# Patient Record
Sex: FEMALE | Race: WHITE | Employment: PART TIME | URBAN - METROPOLITAN AREA
[De-identification: names, ages, dates, MRNs, and addresses within clinical notes are randomized per-mention and may not be internally consistent; named-entity substitution may affect disease eponyms.]

---

## 2017-04-10 NOTE — PROGRESS NOTES
Need to change MRI Brain w/o to MRI Brain w/wo attn Pituitary to be able to assess the pituitary for diagnosis.

## 2017-04-11 ENCOUNTER — HOSPITAL ENCOUNTER (OUTPATIENT)
Dept: MRI IMAGING | Age: 42
Discharge: HOME | End: 2017-04-11
Payer: COMMERCIAL

## 2017-04-11 VITALS — BODY MASS INDEX: 33.49 KG/M2 | WEIGHT: 182 LBS | HEIGHT: 62 IN

## 2017-04-11 DIAGNOSIS — E34.9 ABNORMAL LH LEVEL: ICD-10-CM

## 2017-04-11 DIAGNOSIS — R79.89 ABNORMAL PITUITARY LUTEINIZING HORMONE (LH): ICD-10-CM

## 2017-04-11 PROCEDURE — 70553 MRI BRAIN STEM W/O & W/DYE: CPT

## 2017-04-11 PROCEDURE — 74011250636 HC RX REV CODE- 250/636

## 2017-04-11 PROCEDURE — 74011000258 HC RX REV CODE- 258

## 2017-04-11 PROCEDURE — A9577 INJ MULTIHANCE: HCPCS

## 2017-04-11 RX ORDER — SODIUM CHLORIDE 0.9 % (FLUSH) 0.9 %
10 SYRINGE (ML) INJECTION
Status: COMPLETED | OUTPATIENT
Start: 2017-04-11 | End: 2017-04-11

## 2017-04-11 RX ADMIN — SODIUM CHLORIDE 100 ML: 900 INJECTION, SOLUTION INTRAVENOUS at 14:28

## 2017-04-11 RX ADMIN — Medication 10 ML: at 14:17

## 2017-04-11 RX ADMIN — GADOBENATE DIMEGLUMINE 17 ML: 529 INJECTION, SOLUTION INTRAVENOUS at 14:17

## 2017-04-24 ENCOUNTER — HOSPITAL ENCOUNTER (EMERGENCY)
Age: 42
Discharge: HOME | End: 2017-04-24
Attending: EMERGENCY MEDICINE
Payer: COMMERCIAL

## 2017-04-24 ENCOUNTER — HOSPITAL ENCOUNTER (OUTPATIENT)
Dept: ULTRASOUND IMAGING | Age: 42
Discharge: HOME | End: 2017-04-24
Attending: NURSE PRACTITIONER
Payer: COMMERCIAL

## 2017-04-24 VITALS
SYSTOLIC BLOOD PRESSURE: 141 MMHG | HEIGHT: 62 IN | RESPIRATION RATE: 18 BRPM | BODY MASS INDEX: 33.13 KG/M2 | DIASTOLIC BLOOD PRESSURE: 79 MMHG | HEART RATE: 74 BPM | TEMPERATURE: 98.1 F | OXYGEN SATURATION: 99 % | WEIGHT: 180 LBS

## 2017-04-24 DIAGNOSIS — M79.602 LEFT ARM PAIN: ICD-10-CM

## 2017-04-24 DIAGNOSIS — I80.8 SUPERFICIAL THROMBOPHLEBITIS OF LEFT UPPER EXTREMITY: Primary | ICD-10-CM

## 2017-04-24 DIAGNOSIS — R22.32 LOCALIZED SWELLING, MASS AND LUMP, LEFT UPPER LIMB: ICD-10-CM

## 2017-04-24 PROCEDURE — 93971 EXTREMITY STUDY: CPT

## 2017-04-24 PROCEDURE — 99283 EMERGENCY DEPT VISIT LOW MDM: CPT | Performed by: EMERGENCY MEDICINE

## 2017-04-24 NOTE — ED PROVIDER NOTES
HPI Comments: 59-year-old white female underwent a contrasted MRI approximately 2 weeks ago. For this procedure she had an IV placed in her left before meals. Since then she has had some swelling and tenderness. She underwent ultrasound today which showed a thrombophlebitis with occlusion of the basilic vein from the mid humerus to the proximal forearm and nonocclusive thrombus noted in one of the paired fascia and brachial veins at the junction with the basilic vein. She was sent to the emergency department for further management. She has no other complaints at this time. The history is provided by the patient. Past Medical History:   Diagnosis Date    Acute laryngitis, without mention of obstruction     Acute maxillary sinusitis     Acute upper respiratory infections of unspecified site     Anxiety     Arrhythmia     PVCs. Padma Hummer Borderline hyperlipidemia     Breast screening, unspecified     Contusion of face, scalp, and neck except eye(s)     Contusion of thigh     Cough     Depressive disorder, not elsewhere classified     Dietary surveillance and counseling     Esophageal reflux     Exercise counseling     Injury, other and unspecified, unspecified site     Leg pain     Nocturia     Other malaise and fatigue     Palpitations     Pituitary tumor 2017    Polyuria     Psychiatric disorder     depression    Routine general medical examination at a health care facility     Routine gynecological examination     Screening for other and unspecified cardiovascular conditions     Unspecified adverse effect of anesthesia     difficult to arouse after general    Unspecified fall        Past Surgical History:   Procedure Laterality Date    CARDIAC SURG PROCEDURE UNLIST  2013    ablation    HX  SECTION   &     HX CHOLECYSTECTOMY  3/2014    [facility] - AT [facility]    HX OTHER SURGICAL  2017    oral gum surgery    HX TONSILLECTOMY      HX TUBAL LIGATION 2004         Family History:   Problem Relation Age of Onset    Diabetes Mother      NON-INSULIN DEPENDENT    Cancer Mother      SKIN    No Known Problems Father     No Known Problems Sister     No Known Problems Brother     No Known Problems Brother     Coronary Artery Disease Maternal Grandmother     Diabetes Maternal Grandmother      NON-INSULIN DEPENDENT    Cancer Maternal Grandmother      blood cancer    Coronary Artery Disease Maternal Grandfather     No Known Problems Paternal Grandmother     Cancer Paternal Grandfather      LUNG    Diabetes Maternal Aunt      NON-INSULIN DEPENDENT    Hypertension Other      FAMILY MEMBERS IN GENERAL    Other Sister 28     mysthenia gravus, thymus tumor and masses in lungs concern for ca    Other Maternal Uncle      stage 4 CA - lymphoma       Social History     Social History    Marital status:      Spouse name: N/A    Number of children: N/A    Years of education: N/A     Occupational History    Not on file. Social History Main Topics    Smoking status: Former Smoker     Packs/day: 1.00     Years: 10.00     Types: Cigarettes     Quit date: 1/1/2001    Smokeless tobacco: Never Used    Alcohol use 0.0 oz/week     0 Standard drinks or equivalent per week      Comment: RARELY    Drug use: No    Sexual activity: Not Currently     Other Topics Concern    Not on file     Social History Narrative         ALLERGIES: Adhesive and Other medication    Review of Systems   Constitutional: Negative for fever. Respiratory: Negative for cough and shortness of breath. Gastrointestinal: Negative for vomiting. Neurological: Negative for headaches. Vitals:    04/24/17 1644   BP: 136/85   Pulse: 70   Resp: 16   Temp: 98.4 °F (36.9 °C)   SpO2: 100%   Weight: 81.6 kg (180 lb)   Height: 5' 2\" (1.575 m)            Physical Exam   Constitutional: She appears well-developed and well-nourished. No distress. HENT:   Head: Normocephalic and atraumatic. Cardiovascular: Normal rate. Pulmonary/Chest: Effort normal.   Musculoskeletal:   Mild swelling to the proximal ulnar aspect of the left forearm. There is no erythema or induration. She has good range of motion. Good distal pulses. Neurological: She is alert. Skin: Skin is warm and dry. Psychiatric: She has a normal mood and affect. Nursing note and vitals reviewed. MDM  Number of Diagnoses or Management Options  Diagnosis management comments: The patient has an explanation for her clot in that she recently had an IV with contrast.   The clot is mostly in the superficial vein and is nonocclusive in the deeper vein. At this time I do not believe that this clot warrants the risk of full anticoagulation. I did offer arixtra however at this time she would prefer to treat with aspirin and will obtain repeat ultrasound in 2 weeks to see if the clot is resolving or enlarging.     ED Course       Procedures

## 2017-04-24 NOTE — DISCHARGE INSTRUCTIONS
Superficial Thrombophlebitis: Care Instructions  Your Care Instructions  Superficial thrombophlebitis is inflammation in a vein where a blood clot has formed close to the surface of the skin. You may be able to feel the clot as a firm lump under the skin. The skin over the clot can become red, tender, and warm to the touch. Blood clots in veins close to the skin's surface usually are not serious and often can be treated at home. Sometimes superficial thrombophlebitis spreads to a deeper vein (deep vein thrombosis, or DVT). These deeper clots can be serious, even life-threatening. It is very important that you follow your doctor's instructions, keep all follow-up appointments, and watch for new or worsening symptoms of a clot. Follow-up care is a key part of your treatment and safety. Be sure to make and go to all appointments, and call your doctor if you are having problems. It's also a good idea to know your test results and keep a list of the medicines you take. How can you care for yourself at home? · Take your medicines exactly as prescribed. Call your doctor if you think you are having a problem with your medicine. You will get more details on the specific medicines your doctor prescribes. · Prop up the sore leg or arm on a pillow anytime you sit or lie down. Try to keep it above the level of your heart. To prevent thrombophlebitis  · Exercise. Keep blood moving in your legs to keep new clots from forming. · Get up out of bed as soon as possible after an illness or surgery. · Do not smoke. If you need help quitting, talk to your doctor about stop-smoking programs and medicines. These can increase your chances of quitting for good. · Ask your doctor about compression stockings. These may help prevent blood clots from forming in your legs. You can buy these with a prescription at medical supply stores and some drugstores. When should you call for help?   Call 911 anytime you think you may need emergency care. For example, call if:  · You have sudden chest pain and shortness of breath, or you cough up blood. · You vomit blood or what looks like coffee grounds. · You pass maroon or very bloody stools. · You passed out (lost consciousness). Call your doctor now or seek immediate medical care if:  · You have signs of a blood clot, such as:  ¨ Pain in your calf, back of the knee, thigh, or groin. ¨ Redness and swelling in your leg or groin. · You notice a new hard, red, or tender area in your leg. · Your stools are black and tarlike or have streaks of blood. Watch closely for changes in your health, and be sure to contact your doctor if:  · You do not get better as expected. Where can you learn more? Go to http://mike-dereck.info/. Enter [phone] in the search box to learn more about \"Superficial Thrombophlebitis: Care Instructions. \"  Current as of: June 4, 2016  Content Version: 11.2  © 2400-8771 [employer]. Care instructions adapted under license by [employer] (which disclaims liability or warranty for this information). If you have questions about a medical condition or this instruction, always ask your healthcare professional. [address] any warranty or liability for your use of this information.

## 2017-04-25 ENCOUNTER — HOSPITAL ENCOUNTER (OUTPATIENT)
Dept: GENERAL RADIOLOGY | Age: 42
Discharge: HOME | End: 2017-04-25
Payer: COMMERCIAL

## 2017-04-25 DIAGNOSIS — Z87.891 FORMER SMOKER: ICD-10-CM

## 2017-04-25 DIAGNOSIS — I82.622 ACUTE DEEP VEIN THROMBOSIS (DVT) OF OTHER VEIN OF LEFT UPPER EXTREMITY (HCC): ICD-10-CM

## 2017-04-25 PROCEDURE — 71020 XR CHEST PA LAT: CPT

## 2017-04-25 NOTE — PROGRESS NOTES
Left Upper Extremity Ultrasound positive - Patient aware of results and followed up with ED after ultrasound. She also has appointment scheduled with Wilson Hernandez today. If patient does not show or cancels appointment, please let me know so appropriate follow up can be arranged.

## 2017-05-02 PROBLEM — I82.622 ACUTE DEEP VEIN THROMBOSIS (DVT) OF LEFT UPPER EXTREMITY (HCC): Status: ACTIVE | Noted: 2017-05-02

## 2017-05-03 PROBLEM — R60.0 LOCALIZED EDEMA: Status: ACTIVE | Noted: 2017-05-03

## 2017-05-03 PROBLEM — Z86.718 HISTORY OF DVT (DEEP VEIN THROMBOSIS): Status: ACTIVE | Noted: 2017-05-03

## 2017-05-03 PROBLEM — I80.8 SUPERFICIAL THROMBOPHLEBITIS OF LEFT UPPER EXTREMITY: Status: ACTIVE | Noted: 2017-05-03

## 2017-05-11 ENCOUNTER — HOSPITAL ENCOUNTER (OUTPATIENT)
Dept: LAB | Age: 42
Discharge: HOME | End: 2017-05-11
Payer: COMMERCIAL

## 2017-05-11 DIAGNOSIS — D68.59 THROMBOPHILIA (HCC): ICD-10-CM

## 2017-05-11 LAB
ALBUMIN SERPL BCP-MCNC: 4.1 G/DL (ref 3.5–5)
ALBUMIN/GLOB SERPL: 1.2 {RATIO} (ref 1.2–3.5)
ALP SERPL-CCNC: 70 U/L (ref 50–136)
ALT SERPL-CCNC: 24 U/L (ref 12–65)
ANION GAP BLD CALC-SCNC: 7 MMOL/L (ref 7–16)
AST SERPL W P-5'-P-CCNC: 13 U/L (ref 15–37)
BASOPHILS # BLD AUTO: 0 K/UL (ref 0–0.2)
BASOPHILS # BLD: 1 % (ref 0–2)
BILIRUB SERPL-MCNC: 0.6 MG/DL (ref 0.2–1.1)
BUN SERPL-MCNC: 6 MG/DL (ref 6–23)
CALCIUM SERPL-MCNC: 9 MG/DL (ref 8.3–10.4)
CHLORIDE SERPL-SCNC: 104 MMOL/L (ref 98–107)
CO2 SERPL-SCNC: 28 MMOL/L (ref 21–32)
CREAT SERPL-MCNC: 0.93 MG/DL (ref 0.6–1)
D DIMER PPP FEU-MCNC: 0.79 UG/ML(FEU)
DIFFERENTIAL METHOD BLD: ABNORMAL
EOSINOPHIL # BLD: 0.2 K/UL (ref 0–0.8)
EOSINOPHIL NFR BLD: 4 % (ref 0.5–7.8)
ERYTHROCYTE [DISTWIDTH] IN BLOOD BY AUTOMATED COUNT: 12.5 % (ref 11.9–14.6)
GLOBULIN SER CALC-MCNC: 3.4 G/DL (ref 2.3–3.5)
GLUCOSE SERPL-MCNC: 74 MG/DL (ref 65–100)
HCT VFR BLD AUTO: 40.4 % (ref 35.8–46.3)
HGB BLD-MCNC: 14 G/DL (ref 11.7–15.4)
LYMPHOCYTES # BLD AUTO: 45 % (ref 13–44)
LYMPHOCYTES # BLD: 2.2 K/UL (ref 0.5–4.6)
MCH RBC QN AUTO: 32 PG (ref 26.1–32.9)
MCHC RBC AUTO-ENTMCNC: 34.7 G/DL (ref 31.4–35)
MCV RBC AUTO: 92.4 FL (ref 79.6–97.8)
MONOCYTES # BLD: 0.3 K/UL (ref 0.1–1.3)
MONOCYTES NFR BLD AUTO: 7 % (ref 4–12)
NEUTS SEG # BLD: 2.2 K/UL (ref 1.7–8.2)
NEUTS SEG NFR BLD AUTO: 44 % (ref 43–78)
NRBC # BLD: 0 K/UL (ref 0–0.2)
PLATELET # BLD AUTO: 178 K/UL (ref 150–450)
PMV BLD AUTO: 12.2 FL (ref 10.8–14.1)
POTASSIUM SERPL-SCNC: 4 MMOL/L (ref 3.5–5.1)
PROT SERPL-MCNC: 7.5 G/DL (ref 6.3–8.2)
RBC # BLD AUTO: 4.37 M/UL (ref 4.05–5.25)
SODIUM SERPL-SCNC: 139 MMOL/L (ref 136–145)
WBC # BLD AUTO: 5 K/UL (ref 4.3–11.1)

## 2017-05-11 PROCEDURE — 81240 F2 GENE: CPT | Performed by: INTERNAL MEDICINE

## 2017-05-11 PROCEDURE — 85303 CLOT INHIBIT PROT C ACTIVITY: CPT | Performed by: INTERNAL MEDICINE

## 2017-05-11 PROCEDURE — 86147 CARDIOLIPIN ANTIBODY EA IG: CPT | Performed by: INTERNAL MEDICINE

## 2017-05-11 PROCEDURE — 85379 FIBRIN DEGRADATION QUANT: CPT | Performed by: INTERNAL MEDICINE

## 2017-05-11 PROCEDURE — 81241 F5 GENE: CPT | Performed by: INTERNAL MEDICINE

## 2017-05-11 PROCEDURE — 85300 ANTITHROMBIN III ACTIVITY: CPT | Performed by: INTERNAL MEDICINE

## 2017-05-11 PROCEDURE — 86146 BETA-2 GLYCOPROTEIN ANTIBODY: CPT | Performed by: INTERNAL MEDICINE

## 2017-05-11 PROCEDURE — 85610 PROTHROMBIN TIME: CPT | Performed by: INTERNAL MEDICINE

## 2017-05-11 PROCEDURE — 80053 COMPREHEN METABOLIC PANEL: CPT | Performed by: INTERNAL MEDICINE

## 2017-05-11 PROCEDURE — 85306 CLOT INHIBIT PROT S FREE: CPT | Performed by: INTERNAL MEDICINE

## 2017-05-11 PROCEDURE — 36415 COLL VENOUS BLD VENIPUNCTURE: CPT | Performed by: INTERNAL MEDICINE

## 2017-05-11 PROCEDURE — 85301 ANTITHROMBIN III ANTIGEN: CPT | Performed by: INTERNAL MEDICINE

## 2017-05-11 PROCEDURE — 85025 COMPLETE CBC W/AUTO DIFF WBC: CPT | Performed by: INTERNAL MEDICINE

## 2017-05-13 LAB
B2 GLYCOPROT1 IGA SER-ACNC: <9 GPI IGA UNITS (ref 0–25)
B2 GLYCOPROT1 IGG SER-ACNC: <9 GPI IGG UNITS (ref 0–20)
B2 GLYCOPROT1 IGM SER-ACNC: <9 GPI IGM UNITS (ref 0–32)
CARDIOLIPIN IGA SER IA-ACNC: <9 APL U/ML (ref 0–11)
CARDIOLIPIN IGG SER IA-ACNC: <9 GPL U/ML (ref 0–14)
CARDIOLIPIN IGM SER IA-ACNC: 15 MPL U/ML (ref 0–12)

## 2017-05-18 LAB
AT III ACT/NOR PPP CHRO: NORMAL %
AT III AG ACT/NOR PPP IA: NORMAL %
FACTOR V LEIDEN MUTATION, XMF5LT: NORMAL %
LUPUS ANTICOAG PANEL, XMLUPT: NORMAL
PROT C ACT/NOR PPP CHRO: NORMAL %
PROT S ACT/NOR PPP CHRO: NORMAL %
PROTHROMBIN G2021A MUTATION, XMPTMT: NORMAL

## 2017-06-16 PROBLEM — N92.6 IRREGULAR MENSES: Status: ACTIVE | Noted: 2017-06-16

## 2017-06-16 PROBLEM — R89.9 ABNORMAL LABORATORY TEST: Status: ACTIVE | Noted: 2017-06-16

## 2017-06-21 ENCOUNTER — HOSPITAL ENCOUNTER (OUTPATIENT)
Dept: LAB | Age: 42
Discharge: HOME | End: 2017-06-21
Payer: COMMERCIAL

## 2017-06-21 DIAGNOSIS — I82.622 ACUTE DEEP VEIN THROMBOSIS (DVT) OF LEFT UPPER EXTREMITY, UNSPECIFIED VEIN (HCC): ICD-10-CM

## 2017-06-21 LAB
ALBUMIN SERPL BCP-MCNC: 3.8 G/DL (ref 3.5–5)
ALBUMIN/GLOB SERPL: 1.2 {RATIO} (ref 1.2–3.5)
ALP SERPL-CCNC: 72 U/L (ref 50–136)
ALT SERPL-CCNC: 32 U/L (ref 12–65)
ANION GAP BLD CALC-SCNC: 8 MMOL/L (ref 7–16)
AST SERPL W P-5'-P-CCNC: 25 U/L (ref 15–37)
BASOPHILS # BLD AUTO: 0 K/UL (ref 0–0.2)
BASOPHILS # BLD: 1 % (ref 0–2)
BILIRUB SERPL-MCNC: 0.7 MG/DL (ref 0.2–1.1)
BUN SERPL-MCNC: 8 MG/DL (ref 6–23)
CALCIUM SERPL-MCNC: 8.1 MG/DL (ref 8.3–10.4)
CHLORIDE SERPL-SCNC: 103 MMOL/L (ref 98–107)
CO2 SERPL-SCNC: 25 MMOL/L (ref 21–32)
CREAT SERPL-MCNC: 0.77 MG/DL (ref 0.6–1)
D DIMER PPP FEU-MCNC: <0.19 UG/ML(FEU)
DIFFERENTIAL METHOD BLD: ABNORMAL
EOSINOPHIL # BLD: 0.2 K/UL (ref 0–0.8)
EOSINOPHIL NFR BLD: 5 % (ref 0.5–7.8)
ERYTHROCYTE [DISTWIDTH] IN BLOOD BY AUTOMATED COUNT: 12 % (ref 11.9–14.6)
GLOBULIN SER CALC-MCNC: 3.1 G/DL (ref 2.3–3.5)
GLUCOSE SERPL-MCNC: 99 MG/DL (ref 65–100)
HCT VFR BLD AUTO: 38.1 % (ref 35.8–46.3)
HGB BLD-MCNC: 13.5 G/DL (ref 11.7–15.4)
LYMPHOCYTES # BLD AUTO: 29 % (ref 13–44)
LYMPHOCYTES # BLD: 1.2 K/UL (ref 0.5–4.6)
MCH RBC QN AUTO: 32.2 PG (ref 26.1–32.9)
MCHC RBC AUTO-ENTMCNC: 35.4 G/DL (ref 31.4–35)
MCV RBC AUTO: 90.9 FL (ref 79.6–97.8)
MONOCYTES # BLD: 0.4 K/UL (ref 0.1–1.3)
MONOCYTES NFR BLD AUTO: 9 % (ref 4–12)
NEUTS SEG # BLD: 2.3 K/UL (ref 1.7–8.2)
NEUTS SEG NFR BLD AUTO: 56 % (ref 43–78)
NRBC # BLD: 0 K/UL (ref 0–0.2)
PLATELET # BLD AUTO: 128 K/UL (ref 150–450)
PMV BLD AUTO: 11.8 FL (ref 10.8–14.1)
POTASSIUM SERPL-SCNC: 4 MMOL/L (ref 3.5–5.1)
PROT SERPL-MCNC: 6.9 G/DL (ref 6.3–8.2)
RBC # BLD AUTO: 4.19 M/UL (ref 4.05–5.25)
SODIUM SERPL-SCNC: 136 MMOL/L (ref 136–145)
WBC # BLD AUTO: 4.1 K/UL (ref 4.3–11.1)

## 2017-06-21 PROCEDURE — 85379 FIBRIN DEGRADATION QUANT: CPT | Performed by: INTERNAL MEDICINE

## 2017-06-21 PROCEDURE — 85025 COMPLETE CBC W/AUTO DIFF WBC: CPT | Performed by: INTERNAL MEDICINE

## 2017-06-21 PROCEDURE — 80053 COMPREHEN METABOLIC PANEL: CPT | Performed by: INTERNAL MEDICINE

## 2017-06-21 PROCEDURE — 36415 COLL VENOUS BLD VENIPUNCTURE: CPT | Performed by: INTERNAL MEDICINE

## 2017-09-21 ENCOUNTER — HOSPITAL ENCOUNTER (OUTPATIENT)
Dept: LAB | Age: 42
Discharge: HOME | End: 2017-09-21
Payer: COMMERCIAL

## 2017-09-21 DIAGNOSIS — R76.0 ANTICARDIOLIPIN ANTIBODY POSITIVE: ICD-10-CM

## 2017-09-21 DIAGNOSIS — I82.622 ACUTE DEEP VEIN THROMBOSIS (DVT) OF LEFT UPPER EXTREMITY, UNSPECIFIED VEIN (HCC): ICD-10-CM

## 2017-09-21 LAB
ALBUMIN SERPL-MCNC: 4 G/DL (ref 3.5–5)
ALBUMIN/GLOB SERPL: 1.3 {RATIO} (ref 1.2–3.5)
ALP SERPL-CCNC: 63 U/L (ref 50–136)
ALT SERPL-CCNC: 22 U/L (ref 12–65)
ANION GAP SERPL CALC-SCNC: 6 MMOL/L (ref 7–16)
AST SERPL-CCNC: 14 U/L (ref 15–37)
BASOPHILS # BLD: 0 K/UL (ref 0–0.2)
BASOPHILS NFR BLD: 1 % (ref 0–2)
BILIRUB SERPL-MCNC: 0.8 MG/DL (ref 0.2–1.1)
BUN SERPL-MCNC: 10 MG/DL (ref 6–23)
CALCIUM SERPL-MCNC: 8.8 MG/DL (ref 8.3–10.4)
CHLORIDE SERPL-SCNC: 105 MMOL/L (ref 98–107)
CO2 SERPL-SCNC: 27 MMOL/L (ref 21–32)
CREAT SERPL-MCNC: 0.96 MG/DL (ref 0.6–1)
D DIMER PPP FEU-MCNC: 0.3 UG/ML(FEU)
DIFFERENTIAL METHOD BLD: ABNORMAL
EOSINOPHIL # BLD: 0.3 K/UL (ref 0–0.8)
EOSINOPHIL NFR BLD: 6 % (ref 0.5–7.8)
ERYTHROCYTE [DISTWIDTH] IN BLOOD BY AUTOMATED COUNT: 11.8 % (ref 11.9–14.6)
GLOBULIN SER CALC-MCNC: 3.2 G/DL (ref 2.3–3.5)
GLUCOSE SERPL-MCNC: 82 MG/DL (ref 65–100)
HCT VFR BLD AUTO: 37.9 % (ref 35.8–46.3)
HGB BLD-MCNC: 13.4 G/DL (ref 11.7–15.4)
LYMPHOCYTES # BLD: 1.5 K/UL (ref 0.5–4.6)
LYMPHOCYTES NFR BLD: 33 % (ref 13–44)
MCH RBC QN AUTO: 32.2 PG (ref 26.1–32.9)
MCHC RBC AUTO-ENTMCNC: 35.4 G/DL (ref 31.4–35)
MCV RBC AUTO: 91.1 FL (ref 79.6–97.8)
MONOCYTES # BLD: 0.5 K/UL (ref 0.1–1.3)
MONOCYTES NFR BLD: 10 % (ref 4–12)
NEUTS SEG # BLD: 2.3 K/UL (ref 1.7–8.2)
NEUTS SEG NFR BLD: 51 % (ref 43–78)
NRBC # BLD: 0 K/UL (ref 0–0.2)
PLATELET # BLD AUTO: 151 K/UL (ref 150–450)
PMV BLD AUTO: 11.7 FL (ref 10.8–14.1)
POTASSIUM SERPL-SCNC: 4.1 MMOL/L (ref 3.5–5.1)
PROT SERPL-MCNC: 7.2 G/DL (ref 6.3–8.2)
RBC # BLD AUTO: 4.16 M/UL (ref 4.05–5.25)
SODIUM SERPL-SCNC: 138 MMOL/L (ref 136–145)
WBC # BLD AUTO: 4.6 K/UL (ref 4.3–11.1)

## 2017-09-21 PROCEDURE — 85025 COMPLETE CBC W/AUTO DIFF WBC: CPT | Performed by: INTERNAL MEDICINE

## 2017-09-21 PROCEDURE — 85379 FIBRIN DEGRADATION QUANT: CPT | Performed by: INTERNAL MEDICINE

## 2017-09-21 PROCEDURE — 86147 CARDIOLIPIN ANTIBODY EA IG: CPT | Performed by: INTERNAL MEDICINE

## 2017-09-21 PROCEDURE — 36415 COLL VENOUS BLD VENIPUNCTURE: CPT | Performed by: INTERNAL MEDICINE

## 2017-09-21 PROCEDURE — 80053 COMPREHEN METABOLIC PANEL: CPT | Performed by: INTERNAL MEDICINE

## 2017-09-23 LAB
CARDIOLIPIN IGA SER IA-ACNC: <9 APL U/ML (ref 0–11)
CARDIOLIPIN IGG SER IA-ACNC: <9 GPL U/ML (ref 0–14)
CARDIOLIPIN IGM SER IA-ACNC: 16 MPL U/ML (ref 0–12)

## 2017-12-11 ENCOUNTER — HOSPITAL ENCOUNTER (OUTPATIENT)
Dept: MRI IMAGING | Age: 42
Discharge: HOME | End: 2017-12-11
Attending: INTERNAL MEDICINE
Payer: COMMERCIAL

## 2017-12-11 ENCOUNTER — HOSPITAL ENCOUNTER (OUTPATIENT)
Dept: LAB | Age: 42
Discharge: HOME | End: 2017-12-11
Payer: COMMERCIAL

## 2017-12-11 VITALS — BODY MASS INDEX: 32.94 KG/M2 | WEIGHT: 183 LBS

## 2017-12-11 DIAGNOSIS — R76.0 ANTICARDIOLIPIN ANTIBODY POSITIVE: ICD-10-CM

## 2017-12-11 DIAGNOSIS — D35.2 PITUITARY MICROADENOMA (HCC): ICD-10-CM

## 2017-12-11 DIAGNOSIS — D68.59 THROMBOPHILIA (HCC): ICD-10-CM

## 2017-12-11 LAB
ALBUMIN SERPL-MCNC: 4.1 G/DL (ref 3.5–5)
ALBUMIN/GLOB SERPL: 1.3 {RATIO}
ALP SERPL-CCNC: 69 U/L (ref 50–136)
ALT SERPL-CCNC: 14 U/L (ref 12–65)
ANION GAP SERPL CALC-SCNC: 7 MMOL/L
AST SERPL-CCNC: 12 U/L (ref 15–37)
BASOPHILS # BLD: 0 K/UL (ref 0–0.2)
BASOPHILS NFR BLD: 1 % (ref 0–2)
BILIRUB SERPL-MCNC: 0.5 MG/DL (ref 0.2–1.1)
BUN SERPL-MCNC: 5 MG/DL (ref 6–23)
CALCIUM SERPL-MCNC: 8.6 MG/DL (ref 8.3–10.4)
CHLORIDE SERPL-SCNC: 107 MMOL/L (ref 98–107)
CO2 SERPL-SCNC: 26 MMOL/L (ref 21–32)
CREAT SERPL-MCNC: 0.9 MG/DL (ref 0.6–1)
D DIMER PPP FEU-MCNC: 0.37 UG/ML(FEU)
DIFFERENTIAL METHOD BLD: ABNORMAL
EOSINOPHIL # BLD: 0.2 K/UL (ref 0–0.8)
EOSINOPHIL NFR BLD: 4 % (ref 0.5–7.8)
ERYTHROCYTE [DISTWIDTH] IN BLOOD BY AUTOMATED COUNT: 11.8 % (ref 11.9–14.6)
GLOBULIN SER CALC-MCNC: 3.2 G/DL
GLUCOSE SERPL-MCNC: 82 MG/DL (ref 65–100)
HCT VFR BLD AUTO: 38.5 % (ref 35.8–46.3)
HGB BLD-MCNC: 13.4 G/DL (ref 11.7–15.4)
LYMPHOCYTES # BLD: 1.4 K/UL (ref 0.5–4.6)
LYMPHOCYTES NFR BLD: 32 % (ref 13–44)
MCH RBC QN AUTO: 31.7 PG (ref 26.1–32.9)
MCHC RBC AUTO-ENTMCNC: 34.8 G/DL (ref 31.4–35)
MCV RBC AUTO: 91 FL (ref 79.6–97.8)
MONOCYTES # BLD: 0.3 K/UL (ref 0.1–1.3)
MONOCYTES NFR BLD: 7 % (ref 4–12)
NEUTS SEG # BLD: 2.4 K/UL (ref 1.7–8.2)
NEUTS SEG NFR BLD: 56 % (ref 43–78)
PLATELET # BLD AUTO: 158 K/UL (ref 150–450)
PMV BLD AUTO: 12.7 FL (ref 10.8–14.1)
POTASSIUM SERPL-SCNC: 4 MMOL/L (ref 3.5–5.1)
PROT SERPL-MCNC: 7.3 G/DL (ref 6.3–8.2)
RBC # BLD AUTO: 4.23 M/UL (ref 4.05–5.25)
SODIUM SERPL-SCNC: 140 MMOL/L (ref 136–145)
WBC # BLD AUTO: 4.3 K/UL (ref 4.3–11.1)

## 2017-12-11 PROCEDURE — 70553 MRI BRAIN STEM W/O & W/DYE: CPT

## 2017-12-11 PROCEDURE — 74011250636 HC RX REV CODE- 250/636: Performed by: INTERNAL MEDICINE

## 2017-12-11 PROCEDURE — A9577 INJ MULTIHANCE: HCPCS | Performed by: INTERNAL MEDICINE

## 2017-12-11 PROCEDURE — 74011000258 HC RX REV CODE- 258: Performed by: INTERNAL MEDICINE

## 2017-12-11 RX ORDER — SODIUM CHLORIDE 0.9 % (FLUSH) 0.9 %
10 SYRINGE (ML) INJECTION
Status: COMPLETED | OUTPATIENT
Start: 2017-12-11 | End: 2017-12-11

## 2017-12-11 RX ADMIN — GADOBENATE DIMEGLUMINE 17 ML: 529 INJECTION, SOLUTION INTRAVENOUS at 12:29

## 2017-12-11 RX ADMIN — SODIUM CHLORIDE 100 ML: 900 INJECTION, SOLUTION INTRAVENOUS at 12:29

## 2017-12-11 RX ADMIN — Medication 10 ML: at 12:29

## 2017-12-11 NOTE — PROGRESS NOTES
Please let Ms. Gilberto Johns know that her MRI looks the same as before. We will discuss at her appointment.

## 2017-12-12 PROBLEM — R41.3 MEMORY DIFFICULTY: Status: ACTIVE | Noted: 2017-12-12

## 2017-12-13 LAB
CARDIOLIPIN IGA SER IA-ACNC: <9 APL U/ML (ref 0–11)
CARDIOLIPIN IGG SER IA-ACNC: <9 GPL U/ML (ref 0–14)
CARDIOLIPIN IGM SER IA-ACNC: 20 MPL U/ML (ref 0–12)

## 2017-12-14 LAB — LUPUS ANTICOAG PANEL, XMLUPT: NORMAL

## 2017-12-22 PROBLEM — R90.89 ABNORMAL BRAIN MRI: Status: ACTIVE | Noted: 2017-12-22

## 2017-12-22 PROBLEM — F33.9 RECURRENT DEPRESSION (HCC): Status: ACTIVE | Noted: 2017-12-22

## 2018-06-19 ENCOUNTER — HOSPITAL ENCOUNTER (OUTPATIENT)
Dept: MRI IMAGING | Age: 43
Discharge: HOME | End: 2018-06-19
Attending: INTERNAL MEDICINE

## 2019-03-07 LAB
CHOLESTEROL, TOTAL: 162 MG/DL
CHOLESTEROL/HDL RATIO: NORMAL
HDLC SERPL-MCNC: 44 MG/DL (ref 35–70)
LDL CHOLESTEROL CALCULATED: 101 MG/DL (ref 0–160)
NONHDLC SERPL-MCNC: NORMAL MG/DL
TRIGL SERPL-MCNC: 85 MG/DL
VLDLC SERPL CALC-MCNC: 17 MG/DL

## 2019-03-14 PROBLEM — E66.01 SEVERE OBESITY (HCC): Status: ACTIVE | Noted: 2019-03-14

## 2019-04-02 ENCOUNTER — HOSPITAL ENCOUNTER (OUTPATIENT)
Dept: GENERAL RADIOLOGY | Age: 44
Discharge: HOME | End: 2019-04-02

## 2019-04-02 ENCOUNTER — HOSPITAL ENCOUNTER (OUTPATIENT)
Dept: MAMMOGRAPHY | Age: 44
Discharge: HOME | End: 2019-04-02

## 2019-04-02 DIAGNOSIS — Z12.39 SCREENING FOR BREAST CANCER: ICD-10-CM

## 2019-04-02 DIAGNOSIS — M25.511 ACUTE PAIN OF RIGHT SHOULDER: ICD-10-CM

## 2019-08-02 PROBLEM — I82.622 ACUTE DEEP VEIN THROMBOSIS (DVT) OF LEFT UPPER EXTREMITY (HCC): Status: RESOLVED | Noted: 2017-05-02 | Resolved: 2019-08-02

## 2019-08-02 PROBLEM — I80.8 SUPERFICIAL THROMBOPHLEBITIS OF LEFT UPPER EXTREMITY: Status: RESOLVED | Noted: 2017-05-03 | Resolved: 2019-08-02

## 2021-01-26 ENCOUNTER — HOSPITAL ENCOUNTER (OUTPATIENT)
Dept: MRI IMAGING | Age: 46
Discharge: HOME | End: 2021-01-26
Attending: PHYSICIAN ASSISTANT
Payer: COMMERCIAL

## 2021-01-26 DIAGNOSIS — F40.240 CLAUSTROPHOBIA: ICD-10-CM

## 2021-01-26 DIAGNOSIS — D35.2 PITUITARY MICROADENOMA (HCC): ICD-10-CM

## 2021-01-26 PROCEDURE — 74011000258 HC RX REV CODE- 258: Performed by: PHYSICIAN ASSISTANT

## 2021-01-26 PROCEDURE — 70553 MRI BRAIN STEM W/O & W/DYE: CPT

## 2021-01-26 PROCEDURE — A9575 INJ GADOTERATE MEGLUMI 0.1ML: HCPCS | Performed by: PHYSICIAN ASSISTANT

## 2021-01-26 PROCEDURE — 74011250636 HC RX REV CODE- 250/636: Performed by: PHYSICIAN ASSISTANT

## 2021-01-26 RX ORDER — SODIUM CHLORIDE 0.9 % (FLUSH) 0.9 %
10 SYRINGE (ML) INJECTION
Status: COMPLETED | OUTPATIENT
Start: 2021-01-26 | End: 2021-01-26

## 2021-01-26 RX ORDER — GADOTERATE MEGLUMINE 376.9 MG/ML
17 INJECTION INTRAVENOUS
Status: COMPLETED | OUTPATIENT
Start: 2021-01-26 | End: 2021-01-26

## 2021-01-26 RX ADMIN — SODIUM CHLORIDE 100 ML: 900 INJECTION, SOLUTION INTRAVENOUS at 19:19

## 2021-01-26 RX ADMIN — GADOTERATE MEGLUMINE 17 ML: 376.9 INJECTION INTRAVENOUS at 19:18

## 2021-01-26 RX ADMIN — Medication 10 ML: at 19:18

## 2022-01-24 NOTE — ED TRIAGE NOTES
[facility] sent Rx request for the following:      Requested Prescriptions   Pending Prescriptions Disp Refills     cyclobenzaprine (FLEXERIL) 5 MG tablet  Sig: TAKE 1 TO 2 TABLETS BY MOUTH THREE TIMES DAILY AS NEEDED FOR MUSCLE SPASM 30 tablet 0   Last Prescription Date:   7/19/21  Last Fill Qty/Refills:         30, R-0  Last Office Visit:              11/30/20   Future Office visit:           None  Routing refill request to provider for review/approval because:  Patient overdue for annual exam.    Unable to complete prescription refill per RN Medication Refill Policy. Dianna Woodruff RN .............. 1/24/2022  1:00 PM     Reports was sent from ultrasound for blood clots to left arm.

## 2022-03-18 PROBLEM — Z86.718 HISTORY OF DVT (DEEP VEIN THROMBOSIS): Status: ACTIVE | Noted: 2017-05-03

## 2022-03-19 PROBLEM — R89.9 ABNORMAL LABORATORY TEST: Status: ACTIVE | Noted: 2017-06-16

## 2022-03-19 PROBLEM — R90.89 ABNORMAL BRAIN MRI: Status: ACTIVE | Noted: 2017-12-22

## 2022-03-19 PROBLEM — R60.0 LOCALIZED EDEMA: Status: ACTIVE | Noted: 2017-05-03

## 2022-03-19 PROBLEM — F33.9 RECURRENT DEPRESSION (HCC): Status: ACTIVE | Noted: 2017-12-22

## 2022-03-20 PROBLEM — E66.01 SEVERE OBESITY (HCC): Status: ACTIVE | Noted: 2019-03-14

## 2022-03-20 PROBLEM — N92.6 IRREGULAR MENSES: Status: ACTIVE | Noted: 2017-06-16

## 2022-03-20 PROBLEM — R41.3 MEMORY DIFFICULTY: Status: ACTIVE | Noted: 2017-12-12

## 2022-12-14 ENCOUNTER — TELEMEDICINE (OUTPATIENT)
Dept: FAMILY MEDICINE CLINIC | Facility: CLINIC | Age: 47
End: 2022-12-14
Payer: COMMERCIAL

## 2022-12-14 DIAGNOSIS — F32.5 MAJOR DEPRESSIVE DISORDER WITH SINGLE EPISODE, IN REMISSION (HCC): Primary | ICD-10-CM

## 2022-12-14 DIAGNOSIS — D35.2 PITUITARY ADENOMA (HCC): ICD-10-CM

## 2022-12-14 DIAGNOSIS — J30.2 SEASONAL ALLERGIES: ICD-10-CM

## 2022-12-14 DIAGNOSIS — Z12.31 SCREENING MAMMOGRAM FOR BREAST CANCER: ICD-10-CM

## 2022-12-14 PROCEDURE — 99213 OFFICE O/P EST LOW 20 MIN: CPT | Performed by: FAMILY MEDICINE

## 2022-12-14 RX ORDER — FLUTICASONE PROPIONATE 50 MCG
2 SPRAY, SUSPENSION (ML) NASAL DAILY
Qty: 16 G | Refills: 3 | Status: SHIPPED | OUTPATIENT
Start: 2022-12-14

## 2022-12-14 RX ORDER — CITALOPRAM 40 MG/1
40 TABLET ORAL DAILY
Qty: 90 TABLET | Refills: 1 | Status: SHIPPED | OUTPATIENT
Start: 2022-12-14

## 2022-12-14 ASSESSMENT — PATIENT HEALTH QUESTIONNAIRE - PHQ9
SUM OF ALL RESPONSES TO PHQ QUESTIONS 1-9: 0
SUM OF ALL RESPONSES TO PHQ QUESTIONS 1-9: 0
2. FEELING DOWN, DEPRESSED OR HOPELESS: 0
1. LITTLE INTEREST OR PLEASURE IN DOING THINGS: 0
SUM OF ALL RESPONSES TO PHQ9 QUESTIONS 1 & 2: 0
SUM OF ALL RESPONSES TO PHQ QUESTIONS 1-9: 0
SUM OF ALL RESPONSES TO PHQ QUESTIONS 1-9: 0

## 2022-12-14 NOTE — PROGRESS NOTES
Monae Gonzalez (:  1975) is a Established patient, here for evaluation of the following:    Assessment & Plan   Below is the assessment and plan developed based on review of pertinent history, physical exam, labs, studies, and medications. 1. Major depressive disorder with single episode, in remission (HCC)-continue on Celexa as she is stable. 2. Screening mammogram for breast cancer-overdue for mammogram.  Order was placed and recommended she go to the same location she did last time. 3. Pituitary adenoma (HCC)-stable on last MRI. No follow-up recommended  4. Seasonal allergies-continue Flonase as needed. Return for 6 mos f/u. Subjective   Pt here for virtual - visit. Pt needs refills on depression /anxiety - taking celexa - thinks more anxiety issues. Celexa is working . Denies SI or HI. Seasonal allergies - using it daily. Has sinus drainage. Taking generic zyrtec daily. Pituitary adenoma - last MRI 21- no growth. - 1. Stable small pituitary microadenoma. 2. Unchanged nonspecific white matter changes in the right corona radiata likely  related to headaches or postinflammatory sequela. Discussed vision changes that can happen in her peripheral vision if the tumor grows. Got a flu shot. 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 Last mammogram   - Will order for her. She has not had time to get them. Eye exam - recommend every 2 yrs . None recently  Dentist done biannually   Colon ca screening due. Pt wants to wait till new yr. Discussed screening modalities. Colonoscopy ,cologuard and fit. Pt says probably will do colonoscopy but just not yet. Dicussed importance of screening.      Medication Refill    Review of Systems       Objective   Patient-Reported Vitals  BP Observations: No, remote/electronic monitoring device was not used or able to be verified  Patient-Reported Weight: 208lb       Physical Exam  [INSTRUCTIONS:  \"[x]\" Indicates a positive item  \"[]\" Indicates a negative item  -- DELETE ALL ITEMS NOT EXAMINED]    Constitutional: [x] Appears well-developed and well-nourished [x] No apparent distress      [] Abnormal -     Mental status: [x] Alert and awake  [x] Oriented to person/place/time [x] Able to follow commands    [] Abnormal -     Eyes:   EOM    [x]  Normal    [] Abnormal -   Sclera  [x]  Normal    [] Abnormal -          Discharge [x]  None visible   [] Abnormal -     HENT: [x] Normocephalic, atraumatic  [] Abnormal -   [x] Mouth/Throat: Mucous membranes are moist    External Ears [] Normal  [] Abnormal -    Neck: [x] No visualized mass [] Abnormal -     Pulmonary/Chest: [x] Respiratory effort normal   [x] No visualized signs of difficulty breathing or respiratory distress        [] Abnormal -      Musculoskeletal:   [] Normal gait with no signs of ataxia         [x] Normal range of motion of neck        [] Abnormal -     Neurological:        [x] No Facial Asymmetry (Cranial nerve 7 motor function) (limited exam due to video visit)          [x] No gaze palsy        [] Abnormal -          Skin:        [x] No significant exanthematous lesions or discoloration noted on facial skin         [] Abnormal -            Psychiatric:       [x] Normal Affect [] Abnormal -        [x] No Hallucinations    Other pertinent observable physical exam findings:-         On this date 12/14/2022 I have spent 20 minutes reviewing previous notes, test results and face to face (virtual) with the patient discussing the diagnosis and importance of compliance with the treatment plan as well as documenting on the day of the visit. Herlinda Laureano, was evaluated through a synchronous (real-time) audio-video encounter. The patient (or guardian if applicable) is aware that this is a billable service, which includes applicable co-pays. This Virtual Visit was conducted with patient's (and/or legal guardian's) consent.  The visit was conducted pursuant to the emergency declaration under the [facility], [facility] authority and the [employer] Act. Patient identification was verified, and a caregiver was present when appropriate. The patient was located at Other: car . Provider was located at [facility] [employer] (Appt Dept): Sejal Reed,  [address] [place] [address].         --Vern Diaz MD

## 2023-02-23 ENCOUNTER — OFFICE VISIT (OUTPATIENT)
Dept: FAMILY MEDICINE CLINIC | Facility: CLINIC | Age: 48
End: 2023-02-23
Payer: COMMERCIAL

## 2023-02-23 VITALS
WEIGHT: 207.5 LBS | DIASTOLIC BLOOD PRESSURE: 90 MMHG | BODY MASS INDEX: 38.18 KG/M2 | HEART RATE: 102 BPM | OXYGEN SATURATION: 98 % | SYSTOLIC BLOOD PRESSURE: 138 MMHG | HEIGHT: 62 IN | TEMPERATURE: 98.1 F

## 2023-02-23 DIAGNOSIS — R05.1 ACUTE COUGH: ICD-10-CM

## 2023-02-23 DIAGNOSIS — J06.9 ACUTE UPPER RESPIRATORY INFECTION, UNSPECIFIED: Primary | ICD-10-CM

## 2023-02-23 PROCEDURE — 99213 OFFICE O/P EST LOW 20 MIN: CPT | Performed by: NURSE PRACTITIONER

## 2023-02-23 RX ORDER — DOXYCYCLINE HYCLATE 100 MG/1
100 CAPSULE ORAL 2 TIMES DAILY
Qty: 20 CAPSULE | Refills: 0 | Status: SHIPPED | OUTPATIENT
Start: 2023-02-23 | End: 2023-03-05

## 2023-02-23 RX ORDER — DEXTROMETHORPHAN HYDROBROMIDE AND PROMETHAZINE HYDROCHLORIDE 15; 6.25 MG/5ML; MG/5ML
5 SYRUP ORAL 4 TIMES DAILY PRN
Qty: 118 ML | Refills: 0 | Status: SHIPPED | OUTPATIENT
Start: 2023-02-23 | End: 2023-03-02

## 2023-02-23 RX ORDER — PREDNISONE 10 MG/1
TABLET ORAL
COMMUNITY
Start: 2023-02-20

## 2023-02-23 RX ORDER — BUTALBITAL, ACETAMINOPHEN AND CAFFEINE 50; 325; 40 MG/1; MG/1; MG/1
1 TABLET ORAL EVERY 6 HOURS PRN
COMMUNITY
Start: 2023-02-19

## 2023-02-23 RX ORDER — BROMPHENIRAMINE MALEATE, PSEUDOEPHEDRINE HYDROCHLORIDE, AND DEXTROMETHORPHAN HYDROBROMIDE 2; 30; 10 MG/5ML; MG/5ML; MG/5ML
5 SYRUP ORAL 4 TIMES DAILY PRN
Qty: 120 ML | Refills: 0 | Status: SHIPPED | OUTPATIENT
Start: 2023-02-23 | End: 2023-02-23 | Stop reason: RX

## 2023-02-23 RX ORDER — BENZONATATE 100 MG/1
CAPSULE ORAL
COMMUNITY
Start: 2023-02-20

## 2023-02-23 RX ORDER — ALBUTEROL SULFATE 90 UG/1
2 AEROSOL, METERED RESPIRATORY (INHALATION) EVERY 4 HOURS PRN
COMMUNITY
Start: 2023-02-19 | End: 2023-03-21

## 2023-02-23 ASSESSMENT — ENCOUNTER SYMPTOMS
SINUS PAIN: 0
WHEEZING: 1
SHORTNESS OF BREATH: 0
DIARRHEA: 0
SINUS PRESSURE: 0
NAUSEA: 0
COUGH: 1
VOMITING: 0
ABDOMINAL PAIN: 0

## 2023-02-23 NOTE — LETTER
[address] [id number]  Phone: [phone]  Fax: [phone]    ELISHA Perez NP        February 23, 2023     Patient: Ave Vazquez   YOB: 1975   Date of Visit: 2/23/2023       To Whom It May Concern: It is my medical opinion that Ave Vazquez may return to work 2/27/2023. Was seen at office 2/23/2023. If you have any questions or concerns, please don't hesitate to call.     Sincerely,        ELISHA Perez NP

## 2023-02-23 NOTE — PROGRESS NOTES
[facility], Trisha Golden  Phone [phone]  Fax:  [phone]    Patient: Chary Harry  YOB: 1975  Patient Age 49 y.o. Patient sex: female  Medical Record:  [id number]  Visit Date: 02/23/23    [facility] Note     Chief Complaint   Patient presents with    Congestion     Head & chest, np cough,ST, weak, was seen in ER on 2/19/23 & states she is not any better, given steroids, inhaler & cough med       History of Present Illness:  Ms. Laisha Raines is a pleasant 48-year-old female with a past medical history as noted below who presents with cough, congestion, wheezing, history of fever-now resolved, and headache for 6 days. Patient was seen in the ED for the same symptoms on the 19th. Negative chest x-ray, negative COVID, flu, RSV. She was prescribed an inhaler, prednisone, Tessalon Perles. She states she is continuing to use inhaler and prednisone as prescribed. Tessalon Perles has not helped her cough. She denies any other associated symptoms including chest pain, shortness of breath, nausea, vomiting, or diarrhea. Denies current fever or chills. Denies history of asthma or COPD, vapes daily, former smoker. Allergies:   Allergies   Allergen Reactions    Adhesive Tape Dermatitis     Other reaction(s): Contact Dermatitis      Other Other (See Comments)     PROBLEMS WITH ANESTHESIA       Current Medications:   Medications marked \"taking\" at this time:    Current Outpatient Medications:     predniSONE (DELTASONE) 10 MG tablet, TAKE 4 TABLETS BY MOUTH ONCE DAILY FOR 3 DAYS THEN 3 ONCE DAILY FOR 3 DAYS THEN 2 ONCE DAILY FOR 3 DAYS THEN 1 ONCE DAILY FOR 4 DAYS, Disp: , Rfl:     Cetirizine HCl 10 MG CAPS, Take by mouth, Disp: , Rfl:     butalbital-acetaminophen-caffeine (FIORICET, ESGIC) -40 MG per tablet, Take 1 tablet by mouth every 6 hours as needed, Disp: , Rfl:     benzonatate (TESSALON) 100 MG capsule, TAKE 2 CAPSULES BY MOUTH THREE TIMES DAILY AS NEEDED FOR COUGH, Disp: , Rfl:     albuterol sulfate HFA (PROVENTIL;VENTOLIN;PROAIR) 108 (90 Base) MCG/ACT inhaler, Inhale 2 puffs into the lungs every 4 hours as needed, Disp: , Rfl:     brompheniramine-pseudoephedrine-DM (BROMFED DM) 2-30-10 MG/5ML syrup, Take 5 mLs by mouth 4 times daily as needed for Congestion or Cough, Disp: 120 mL, Rfl: 0    doxycycline hyclate (VIBRAMYCIN) 100 MG capsule, Take 1 capsule by mouth 2 times daily for 10 days, Disp: 20 capsule, Rfl: 0    citalopram (CELEXA) 40 MG tablet, Take 1 tablet by mouth daily, Disp: 90 tablet, Rfl: 1    fluticasone (FLONASE) 50 MCG/ACT nasal spray, 2 sprays by Nasal route daily, Disp: 16 g, Rfl: 3    LORazepam (ATIVAN) 1 MG tablet, Take 1 mg by mouth every 4 hours as needed. , Disp: , Rfl:     Current Problem List:   Patient Active Problem List   Diagnosis    Anxiety    Well adult on routine health check    Mildly obese    History of DVT (deep vein thrombosis)    Abnormal brain MRI    Abnormal laboratory test    Pituitary adenoma (HCC)    Localized edema    Screening mammogram for breast cancer    Anxiety and depression    Recurrent depression ([facility]    Severe obesity ([facility]    Memory difficulty    Irregular menses    Major depressive disorder with single episode, in remission ([facility]    Seasonal allergies       Social History:   Social History     Tobacco Use    Smoking status: Former     Packs/day: 1.00     Types: Cigarettes     Quit date: 2001     Years since quittin.1    Smokeless tobacco: Never   Substance Use Topics    Alcohol use:  Yes     Alcohol/week: 0.0 standard drinks       Family History:   Family History   Problem Relation Age of Onset    No Known Problems Father     Other Sister 28        mysthenia gravis, thymoma, lung mass    Diabetes Maternal Aunt     Cancer Paternal Grandfather         lung (smoker)    Cancer Mother         skin    No Known Problems Brother     Coronary Art Dis Maternal Grandmother     Diabetes Maternal Grandmother     No Known Problems Sister     Diabetes Maternal Uncle     Cancer Maternal Uncle         lymphoma    Thyroid Disease Neg Hx     Diabetes Mother     No Known Problems Brother     Cancer Maternal Grandmother         blood cancer    Coronary Art Dis Maternal Grandfather     No Known Problems Paternal Grandmother     Other Maternal Uncle         sarcoidosis       Surgical History:  Past Surgical History:   Procedure Laterality Date     SECTION   &     CHOLECYSTECTOMY  2014    HEENT  2017    gum surgery    OK UNLISTED PROCEDURE CARDIAC SURGERY  2013    ablation    TONSILLECTOMY      TUBAL LIGATION  2004       ROS  Review of Systems   Constitutional:  Negative for chills, fatigue and fever. HENT:  Positive for congestion. Negative for ear pain, sinus pressure and sinus pain. Respiratory:  Positive for cough and wheezing. Negative for shortness of breath. Cardiovascular:  Negative for chest pain and palpitations. Gastrointestinal:  Negative for abdominal pain, diarrhea, nausea and vomiting. Genitourinary:  Negative for difficulty urinating. Musculoskeletal:  Negative for arthralgias and myalgias. Neurological:  Positive for headaches. Negative for dizziness, weakness and numbness. Psychiatric/Behavioral:  Negative for confusion. Visit Vitals  BP (!) 138/90 (Site: Left Upper Arm, Position: Sitting)   Pulse (!) 102   Temp 98.1 °F (36.7 °C) (Temporal)   Ht 5' 2\" (1.575 m)   Wt 207 lb 8 oz (94.1 kg)   SpO2 98%   BMI 37.95 kg/m²       Physical Exam  Physical Exam  Constitutional:       General: She is not in acute distress. Appearance: Normal appearance. She is not ill-appearing. HENT:      Right Ear: Tympanic membrane normal.      Left Ear: Tympanic membrane normal.      Nose: Congestion present. Cardiovascular:      Rate and Rhythm: Normal rate and regular rhythm. Heart sounds: No murmur heard. No gallop.    Pulmonary:      Effort: Pulmonary effort is normal.      Breath sounds: Normal breath sounds. No wheezing or rhonchi. Musculoskeletal:         General: No swelling. Neurological:      General: No focal deficit present. Mental Status: She is alert and oriented to person, place, and time. Psychiatric:         Mood and Affect: Mood normal.         Behavior: Behavior normal.          ASSESSMENT & PLAN      I have reviewed the patient's past medical history, social history and family history and vitals. We have discussed treatment plan and follow up and given patient instructions. Patient's questions are answered and we will follow up as indicated. Low Henderson was seen today for congestion. Diagnoses and all orders for this visit:    Acute upper respiratory infection, unspecified  -     doxycycline hyclate (VIBRAMYCIN) 100 MG capsule; Take 1 capsule by mouth 2 times daily for 10 days    Acute cough  -     brompheniramine-pseudoephedrine-DM (BROMFED DM) 2-30-10 MG/5ML syrup; Take 5 mLs by mouth 4 times daily as needed for Congestion or Cough    -Continue prednisone and inhaler as previously prescribed.  -Smoking cessation recommended. -Recommended treatments include: OTC fever/pain reducers such as Tylenol or ibuprofen, antihistamines, nasal steroids, rest, adequate fluid intake. Please let office know if symptoms do not improve over the next 1 to 2 weeks or if symptoms worsen. Advised emergent treatment if she develops shortness of breath, chest pain, or any other associated symptoms.           ELISHA Goddard - NP

## 2023-07-31 ENCOUNTER — COMMUNITY OUTREACH (OUTPATIENT)
Dept: FAMILY MEDICINE CLINIC | Facility: CLINIC | Age: 48
End: 2023-07-31

## 2023-08-24 ENCOUNTER — OFFICE VISIT (OUTPATIENT)
Dept: FAMILY MEDICINE CLINIC | Facility: CLINIC | Age: 48
End: 2023-08-24
Payer: COMMERCIAL

## 2023-08-24 VITALS
DIASTOLIC BLOOD PRESSURE: 93 MMHG | OXYGEN SATURATION: 99 % | HEIGHT: 62 IN | BODY MASS INDEX: 36.44 KG/M2 | SYSTOLIC BLOOD PRESSURE: 153 MMHG | WEIGHT: 198 LBS | TEMPERATURE: 97.8 F | HEART RATE: 74 BPM

## 2023-08-24 DIAGNOSIS — E66.01 SEVERE OBESITY (BMI 35.0-39.9) WITH COMORBIDITY (HCC): ICD-10-CM

## 2023-08-24 DIAGNOSIS — Z13.220 SCREENING FOR HYPERLIPIDEMIA: ICD-10-CM

## 2023-08-24 DIAGNOSIS — R30.0 DYSURIA: Primary | ICD-10-CM

## 2023-08-24 DIAGNOSIS — N92.0 MENORRHAGIA WITH REGULAR CYCLE: ICD-10-CM

## 2023-08-24 DIAGNOSIS — Z12.11 SCREENING FOR MALIGNANT NEOPLASM OF COLON: ICD-10-CM

## 2023-08-24 DIAGNOSIS — F32.5 MAJOR DEPRESSIVE DISORDER WITH SINGLE EPISODE, IN REMISSION (HCC): ICD-10-CM

## 2023-08-24 DIAGNOSIS — I10 PRIMARY HYPERTENSION: ICD-10-CM

## 2023-08-24 LAB
ALBUMIN SERPL-MCNC: 3.9 G/DL (ref 3.5–5)
ALBUMIN/GLOB SERPL: 1.2 (ref 0.4–1.6)
ALP SERPL-CCNC: 80 U/L (ref 50–136)
ALT SERPL-CCNC: 26 U/L (ref 12–65)
ANION GAP SERPL CALC-SCNC: 9 MMOL/L (ref 2–11)
AST SERPL-CCNC: 16 U/L (ref 15–37)
BASOPHILS # BLD: 0.1 K/UL (ref 0–0.2)
BASOPHILS NFR BLD: 1 % (ref 0–2)
BILIRUB SERPL-MCNC: 0.6 MG/DL (ref 0.2–1.1)
BILIRUBIN, URINE, POC: NEGATIVE
BLOOD URINE, POC: NEGATIVE
BUN SERPL-MCNC: 9 MG/DL (ref 6–23)
CALCIUM SERPL-MCNC: 9.5 MG/DL (ref 8.3–10.4)
CHLORIDE SERPL-SCNC: 104 MMOL/L (ref 101–110)
CHOLEST SERPL-MCNC: 214 MG/DL
CO2 SERPL-SCNC: 24 MMOL/L (ref 21–32)
CREAT SERPL-MCNC: 1 MG/DL (ref 0.6–1)
DIFFERENTIAL METHOD BLD: NORMAL
EOSINOPHIL # BLD: 0.2 K/UL (ref 0–0.8)
EOSINOPHIL NFR BLD: 3 % (ref 0.5–7.8)
ERYTHROCYTE [DISTWIDTH] IN BLOOD BY AUTOMATED COUNT: 12.4 % (ref 11.9–14.6)
GLOBULIN SER CALC-MCNC: 3.3 G/DL (ref 2.8–4.5)
GLUCOSE SERPL-MCNC: 89 MG/DL (ref 65–100)
GLUCOSE URINE, POC: NEGATIVE
HCT VFR BLD AUTO: 39.3 % (ref 35.8–46.3)
HDLC SERPL-MCNC: 50 MG/DL (ref 40–60)
HDLC SERPL: 4.3
HGB BLD-MCNC: 13.1 G/DL (ref 11.7–15.4)
IMM GRANULOCYTES # BLD AUTO: 0 K/UL (ref 0–0.5)
IMM GRANULOCYTES NFR BLD AUTO: 0 % (ref 0–5)
KETONES, URINE, POC: NEGATIVE
LDLC SERPL CALC-MCNC: 129.2 MG/DL
LEUKOCYTE ESTERASE, URINE, POC: NEGATIVE
LYMPHOCYTES # BLD: 1.9 K/UL (ref 0.5–4.6)
LYMPHOCYTES NFR BLD: 31 % (ref 13–44)
MCH RBC QN AUTO: 32 PG (ref 26.1–32.9)
MCHC RBC AUTO-ENTMCNC: 33.3 G/DL (ref 31.4–35)
MCV RBC AUTO: 95.9 FL (ref 82–102)
MONOCYTES # BLD: 0.4 K/UL (ref 0.1–1.3)
MONOCYTES NFR BLD: 7 % (ref 4–12)
NEUTS SEG # BLD: 3.6 K/UL (ref 1.7–8.2)
NEUTS SEG NFR BLD: 58 % (ref 43–78)
NITRITE, URINE, POC: NEGATIVE
NRBC # BLD: 0 K/UL (ref 0–0.2)
PH, URINE, POC: 6 (ref 4.6–8)
PLATELET # BLD AUTO: 203 K/UL (ref 150–450)
PMV BLD AUTO: 12.3 FL (ref 9.4–12.3)
POTASSIUM SERPL-SCNC: 4.4 MMOL/L (ref 3.5–5.1)
PROT SERPL-MCNC: 7.2 G/DL (ref 6.3–8.2)
PROTEIN,URINE, POC: NEGATIVE
RBC # BLD AUTO: 4.1 M/UL (ref 4.05–5.2)
SODIUM SERPL-SCNC: 137 MMOL/L (ref 133–143)
SPECIFIC GRAVITY, URINE, POC: 1.02 (ref 1–1.03)
TRIGL SERPL-MCNC: 174 MG/DL (ref 35–150)
TSH, 3RD GENERATION: 2.51 UIU/ML (ref 0.36–3.74)
URINALYSIS CLARITY, POC: CLEAR
URINALYSIS COLOR, POC: NORMAL
UROBILINOGEN, POC: NORMAL
VLDLC SERPL CALC-MCNC: 34.8 MG/DL (ref 6–23)
WBC # BLD AUTO: 6.1 K/UL (ref 4.3–11.1)

## 2023-08-24 PROCEDURE — 3080F DIAST BP >= 90 MM HG: CPT | Performed by: FAMILY MEDICINE

## 2023-08-24 PROCEDURE — 99214 OFFICE O/P EST MOD 30 MIN: CPT | Performed by: FAMILY MEDICINE

## 2023-08-24 PROCEDURE — 81003 URINALYSIS AUTO W/O SCOPE: CPT | Performed by: FAMILY MEDICINE

## 2023-08-24 PROCEDURE — 3077F SYST BP >= 140 MM HG: CPT | Performed by: FAMILY MEDICINE

## 2023-08-24 RX ORDER — CITALOPRAM 40 MG/1
40 TABLET ORAL DAILY
Qty: 90 TABLET | Refills: 3 | Status: SHIPPED | OUTPATIENT
Start: 2023-08-24

## 2023-08-24 ASSESSMENT — ENCOUNTER SYMPTOMS
BLOOD IN STOOL: 0
SINUS PAIN: 1
COUGH: 0
SHORTNESS OF BREATH: 0
DIARRHEA: 0
SINUS PRESSURE: 1
WHEEZING: 0
CONSTIPATION: 0

## 2023-08-24 ASSESSMENT — PATIENT HEALTH QUESTIONNAIRE - PHQ9
10. IF YOU CHECKED OFF ANY PROBLEMS, HOW DIFFICULT HAVE THESE PROBLEMS MADE IT FOR YOU TO DO YOUR WORK, TAKE CARE OF THINGS AT HOME, OR GET ALONG WITH OTHER PEOPLE: 1
5. POOR APPETITE OR OVEREATING: 0
2. FEELING DOWN, DEPRESSED OR HOPELESS: 1
SUM OF ALL RESPONSES TO PHQ QUESTIONS 1-9: 4
1. LITTLE INTEREST OR PLEASURE IN DOING THINGS: 1
4. FEELING TIRED OR HAVING LITTLE ENERGY: 1
SUM OF ALL RESPONSES TO PHQ QUESTIONS 1-9: 4
7. TROUBLE CONCENTRATING ON THINGS, SUCH AS READING THE NEWSPAPER OR WATCHING TELEVISION: 0
8. MOVING OR SPEAKING SO SLOWLY THAT OTHER PEOPLE COULD HAVE NOTICED. OR THE OPPOSITE, BEING SO FIGETY OR RESTLESS THAT YOU HAVE BEEN MOVING AROUND A LOT MORE THAN USUAL: 0
SUM OF ALL RESPONSES TO PHQ QUESTIONS 1-9: 4
SUM OF ALL RESPONSES TO PHQ9 QUESTIONS 1 & 2: 2
3. TROUBLE FALLING OR STAYING ASLEEP: 1
SUM OF ALL RESPONSES TO PHQ QUESTIONS 1-9: 4
6. FEELING BAD ABOUT YOURSELF - OR THAT YOU ARE A FAILURE OR HAVE LET YOURSELF OR YOUR FAMILY DOWN: 0
9. THOUGHTS THAT YOU WOULD BE BETTER OFF DEAD, OR OF HURTING YOURSELF: 0

## 2023-08-24 NOTE — PATIENT INSTRUCTIONS
Schedule the mammogram  Do the cologuard for colon ca screening. IT will come in the mail     Referral to GYN to look at periods. Check blood pressure - Goal is <130/80. Send me readings via [employer]  from home to see what you are running at home. Send me 5-6 readings .

## 2023-08-25 ENCOUNTER — PATIENT MESSAGE (OUTPATIENT)
Dept: FAMILY MEDICINE CLINIC | Facility: CLINIC | Age: 48
End: 2023-08-25

## 2023-08-25 DIAGNOSIS — I10 PRIMARY HYPERTENSION: Primary | ICD-10-CM

## 2023-08-28 RX ORDER — LISINOPRIL 10 MG/1
10 TABLET ORAL DAILY
Qty: 90 TABLET | Refills: 1 | Status: SHIPPED | OUTPATIENT
Start: 2023-08-28

## 2023-09-22 ENCOUNTER — HOSPITAL ENCOUNTER (OUTPATIENT)
Dept: MAMMOGRAPHY | Age: 48
Discharge: HOME | End: 2023-09-22
Attending: FAMILY MEDICINE
Payer: COMMERCIAL

## 2023-09-22 DIAGNOSIS — Z12.31 SCREENING MAMMOGRAM FOR BREAST CANCER: ICD-10-CM

## 2023-09-22 PROCEDURE — 77067 SCR MAMMO BI INCL CAD: CPT

## 2025-02-04 ENCOUNTER — TELEMEDICINE (OUTPATIENT)
Dept: FAMILY MEDICINE CLINIC | Facility: CLINIC | Age: 50
End: 2025-02-04
Payer: COMMERCIAL

## 2025-02-04 DIAGNOSIS — J30.2 SEASONAL ALLERGIES: ICD-10-CM

## 2025-02-04 DIAGNOSIS — F32.5 MAJOR DEPRESSIVE DISORDER WITH SINGLE EPISODE, IN REMISSION (HCC): Primary | ICD-10-CM

## 2025-02-04 DIAGNOSIS — Z12.31 SCREENING MAMMOGRAM FOR BREAST CANCER: ICD-10-CM

## 2025-02-04 DIAGNOSIS — Z12.11 SCREENING FOR MALIGNANT NEOPLASM OF COLON: ICD-10-CM

## 2025-02-04 DIAGNOSIS — I10 PRIMARY HYPERTENSION: ICD-10-CM

## 2025-02-04 DIAGNOSIS — E78.2 MIXED HYPERLIPIDEMIA: ICD-10-CM

## 2025-02-04 PROCEDURE — 99214 OFFICE O/P EST MOD 30 MIN: CPT | Performed by: FAMILY MEDICINE

## 2025-02-04 RX ORDER — ALBUTEROL SULFATE 90 UG/1
2 INHALANT RESPIRATORY (INHALATION) EVERY 4 HOURS PRN
Qty: 18 G | Refills: 1 | Status: SHIPPED | OUTPATIENT
Start: 2025-02-04 | End: 2027-01-21

## 2025-02-04 RX ORDER — CITALOPRAM HYDROBROMIDE 40 MG/1
40 TABLET ORAL DAILY
Qty: 90 TABLET | Refills: 3 | Status: SHIPPED | OUTPATIENT
Start: 2025-02-04

## 2025-02-04 SDOH — ECONOMIC STABILITY: FOOD INSECURITY: WITHIN THE PAST 12 MONTHS, THE FOOD YOU BOUGHT JUST DIDN'T LAST AND YOU DIDN'T HAVE MONEY TO GET MORE.: NEVER TRUE

## 2025-02-04 SDOH — ECONOMIC STABILITY: FOOD INSECURITY: WITHIN THE PAST 12 MONTHS, YOU WORRIED THAT YOUR FOOD WOULD RUN OUT BEFORE YOU GOT MONEY TO BUY MORE.: NEVER TRUE

## 2025-02-04 ASSESSMENT — ENCOUNTER SYMPTOMS
COUGH: 0
ABDOMINAL PAIN: 0
CHEST TIGHTNESS: 0
DIARRHEA: 0
SHORTNESS OF BREATH: 0
WHEEZING: 0
BLOOD IN STOOL: 0
CONSTIPATION: 0

## 2025-02-04 ASSESSMENT — PATIENT HEALTH QUESTIONNAIRE - PHQ9
2. FEELING DOWN, DEPRESSED OR HOPELESS: NOT AT ALL
SUM OF ALL RESPONSES TO PHQ QUESTIONS 1-9: 0
SUM OF ALL RESPONSES TO PHQ QUESTIONS 1-9: 0
SUM OF ALL RESPONSES TO PHQ9 QUESTIONS 1 & 2: 0
SUM OF ALL RESPONSES TO PHQ QUESTIONS 1-9: 0
1. LITTLE INTEREST OR PLEASURE IN DOING THINGS: NOT AT ALL
SUM OF ALL RESPONSES TO PHQ QUESTIONS 1-9: 0

## 2025-02-04 NOTE — ASSESSMENT & PLAN NOTE
Stable on Celexa 40 mg daily.  Denies any suicidal or homicidal thoughts    Orders:    citalopram (CELEXA) 40 MG tablet; Take 1 tablet by mouth daily

## 2025-02-04 NOTE — ASSESSMENT & PLAN NOTE
Discussed need to get her mammogram completed.  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    Orders:    IDALMIS DIGITAL SCREEN W OR WO CAD BILATERAL; Future

## 2025-02-04 NOTE — ASSESSMENT & PLAN NOTE
Continue on Zyrtec 10 mg daily and albuterol as needed.  She does get some asthma symptoms during allergy season    Orders:    albuterol sulfate HFA (PROVENTIL;VENTOLIN;PROAIR) 108 (90 Base) MCG/ACT inhaler; Inhale 2 puffs into the lungs every 4 hours as needed for Wheezing     Family informed/Patient informed

## 2025-02-04 NOTE — PROGRESS NOTES
Catrina Fam, was evaluated through a synchronous (real-time) audio-video encounter. The patient (or guardian if applicable) is aware that this is a billable service, which includes applicable co-pays. This Virtual Visit was conducted with patient's (and/or legal guardian's) consent. Patient identification was verified, and a caregiver was present when appropriate.   The patient was located at Home: [facility] [place] [address]  Provider was located at Facility (Appt Dept): [id number] [place] [address]  Confirm you are appropriately licensed, registered, or certified to deliver care in the state where the patient is located as indicated above. If you are not or unsure, please re-schedule the visit: Yes, I confirm.     Catrina Fam (:  1975) is a Established patient, presenting virtually for evaluation of the following:      Below is the assessment and plan developed based on review of pertinent history, physical exam, labs, studies, and medications.     Assessment & Plan  Major depressive disorder with single episode, in remission (HCC)  Stable on Celexa 40 mg daily.  Denies any suicidal or homicidal thoughts    Orders:    citalopram (CELEXA) 40 MG tablet; Take 1 tablet by mouth daily    Primary hypertension    She was on lisinopril and has stopped the medication as her blood pressure has been normal.  Continue to monitor at home with a goal of less than 130/80.         Mixed hyperlipidemia    Last LDL in 2023 was 129.2.  Discussed diet and limiting fried and fatty foods.  Currently not on any medication.  She is due to have labs completed         Seasonal allergies  Continue on Zyrtec 10 mg daily and albuterol as needed.  She does get some asthma symptoms during allergy season    Orders:    albuterol sulfate HFA (PROVENTIL;VENTOLIN;PROAIR) 108 (90 Base) MCG/ACT inhaler; Inhale 2 puffs into the lungs every 4 hours as needed for Wheezing    Screening mammogram for breast

## 2025-02-04 NOTE — PATIENT INSTRUCTIONS
Call [phone] to schedule your mammogram.  You can do it at [facility] near [facility].

## 2025-07-14 NOTE — PROGRESS NOTES
Patient's HM shows they are overdue for Colorectal Screening. Care Everywhere and  files searched. No results to attach to order nor HM updated. Detail Level: Detailed Consent: The patient's consent was obtained including but not limited to risks of crusting, scabbing, blistering, scarring, darker or lighter pigmentary change, recurrence, incomplete removal and infection. Render Post-Care Instructions In Note?: no Number Of Freeze-Thaw Cycles: 2 freeze-thaw cycles Post-Care Instructions: I reviewed with the patient in detail post-care instructions. Patient is to wear sunprotection, and avoid picking at any of the treated lesions. Pt may apply Vaseline to crusted or scabbing areas. Duration Of Freeze Thaw-Cycle (Seconds): 2 Show Applicator Variable?: Yes